# Patient Record
Sex: FEMALE | Race: WHITE | URBAN - METROPOLITAN AREA
[De-identification: names, ages, dates, MRNs, and addresses within clinical notes are randomized per-mention and may not be internally consistent; named-entity substitution may affect disease eponyms.]

---

## 2022-11-14 ENCOUNTER — SURGERY (OUTPATIENT)
Dept: URBAN - METROPOLITAN AREA EXTERNAL CLINIC 26 | Facility: EXTERNAL CLINIC | Age: 62
End: 2022-11-14
Payer: COMMERCIAL

## 2022-11-14 PROCEDURE — 67113 REPAIR RETINAL DETACH CPLX: CPT | Performed by: OPHTHALMOLOGY

## 2022-11-15 ENCOUNTER — POST-OPERATIVE VISIT (OUTPATIENT)
Dept: URBAN - METROPOLITAN AREA CLINIC 27 | Facility: CLINIC | Age: 62
End: 2022-11-15
Payer: COMMERCIAL

## 2022-11-15 DIAGNOSIS — H33.011 RETINAL DETACHMENT WITH SINGLE BREAK, RIGHT EYE: Primary | ICD-10-CM

## 2022-11-15 PROCEDURE — 99024 POSTOP FOLLOW-UP VISIT: CPT | Performed by: OPHTHALMOLOGY

## 2022-11-15 ASSESSMENT — INTRAOCULAR PRESSURE
OS: 17
OD: 14

## 2022-11-15 NOTE — IMPRESSION/PLAN
Impression: S/P 25G PPV/EL/GAS OD - 1 Day. Retinal detachment with single break, right eye  H33.011.  Plan: --retina attached under gas
--no s/s infection
--IOP acceptable
--start PF/Oflox QID
--RD/endoph WS discussed
--alt restrictions/positioning discussed
--f/u 1 week PO OD w/ DYK

## 2022-11-21 ENCOUNTER — POST-OPERATIVE VISIT (OUTPATIENT)
Dept: URBAN - METROPOLITAN AREA CLINIC 27 | Facility: CLINIC | Age: 62
End: 2022-11-21
Payer: COMMERCIAL

## 2022-11-21 DIAGNOSIS — H33.011 RETINAL DETACHMENT WITH SINGLE BREAK, RIGHT EYE: Primary | ICD-10-CM

## 2022-11-21 PROCEDURE — 99024 POSTOP FOLLOW-UP VISIT: CPT | Performed by: OPHTHALMOLOGY

## 2022-11-21 ASSESSMENT — INTRAOCULAR PRESSURE
OD: 19
OS: 14

## 2022-11-21 NOTE — IMPRESSION/PLAN
Impression: S/P 25G PPV/EL/GAS OD - 7 Days. Retinal detachment with single break, right eye  H33.011. Plan: Doing well. Taper Rx. Alt precautions. 

4 weeks DYK

## 2022-12-19 ENCOUNTER — POST-OPERATIVE VISIT (OUTPATIENT)
Dept: URBAN - METROPOLITAN AREA CLINIC 27 | Facility: CLINIC | Age: 62
End: 2022-12-19
Payer: COMMERCIAL

## 2022-12-19 DIAGNOSIS — H33.011 RETINAL DETACHMENT WITH SINGLE BREAK, RIGHT EYE: Primary | ICD-10-CM

## 2022-12-19 PROCEDURE — 99024 POSTOP FOLLOW-UP VISIT: CPT | Performed by: OPHTHALMOLOGY

## 2022-12-19 ASSESSMENT — INTRAOCULAR PRESSURE
OD: 17
OS: 15

## 2022-12-19 NOTE — IMPRESSION/PLAN
Impression: S/P 25G PPV/EL/GAS OD - 35 Days. Retinal detachment with single break, right eye  H33.011. Plan: Fully attached and stable. 

1 month with DYK or SI

## 2023-01-20 ENCOUNTER — POST-OPERATIVE VISIT (OUTPATIENT)
Dept: URBAN - METROPOLITAN AREA CLINIC 27 | Facility: CLINIC | Age: 63
End: 2023-01-20
Payer: COMMERCIAL

## 2023-01-20 DIAGNOSIS — H33.011 RETINAL DETACHMENT WITH SINGLE BREAK, RIGHT EYE: Primary | ICD-10-CM

## 2023-01-20 PROCEDURE — 99024 POSTOP FOLLOW-UP VISIT: CPT | Performed by: OPHTHALMOLOGY

## 2023-01-20 ASSESSMENT — INTRAOCULAR PRESSURE
OS: 16
OD: 18

## 2023-01-20 NOTE — IMPRESSION/PLAN
Impression: S/P 25G PPV/EL/GAS OD - 11/14/2022 ([facility]) Plan: Retina is attached. No s/s of infection IOP is good at (18) Good laser at 4 and 5 oclock OD Return in 1 month, OCT OU, possible TOYIN OS 2) Lattice degeneration OS Will assess need for laser OS next visit

## 2023-02-24 ENCOUNTER — OFFICE VISIT (OUTPATIENT)
Dept: URBAN - METROPOLITAN AREA CLINIC 27 | Facility: CLINIC | Age: 63
End: 2023-02-24
Payer: COMMERCIAL

## 2023-02-24 DIAGNOSIS — H43.813 VITREOUS DEGENERATION, BILATERAL: ICD-10-CM

## 2023-02-24 DIAGNOSIS — H33.011 RETINAL DETACHMENT WITH SINGLE BREAK, RIGHT EYE: Primary | ICD-10-CM

## 2023-02-24 DIAGNOSIS — H04.123 DRY EYE SYNDROME OF BILATERAL LACRIMAL GLANDS: ICD-10-CM

## 2023-02-24 DIAGNOSIS — H35.412 LATTICE DEGENERATION OF RETINA, LEFT EYE: ICD-10-CM

## 2023-02-24 DIAGNOSIS — H25.13 AGE-RELATED NUCLEAR CATARACT, BILATERAL: ICD-10-CM

## 2023-02-24 PROCEDURE — 92134 CPTRZ OPH DX IMG PST SGM RTA: CPT | Performed by: OPHTHALMOLOGY

## 2023-02-24 PROCEDURE — 67220 TREATMENT OF CHOROID LESION: CPT | Performed by: OPHTHALMOLOGY

## 2023-02-24 PROCEDURE — 92014 COMPRE OPH EXAM EST PT 1/>: CPT | Performed by: OPHTHALMOLOGY

## 2023-02-24 RX ORDER — PREDNISOLONE ACETATE 10 MG/ML
1 % SUSPENSION/ DROPS OPHTHALMIC
Qty: 5 | Refills: 3 | Status: ACTIVE
Start: 2023-02-24

## 2023-02-24 RX ORDER — MOXIFLOXACIN HYDROCHLORIDE 5 MG/ML
0.5 % SOLUTION/ DROPS OPHTHALMIC
Qty: 5 | Refills: 3 | Status: ACTIVE
Start: 2023-02-24

## 2023-02-24 ASSESSMENT — INTRAOCULAR PRESSURE
OD: 14
OS: 10

## 2023-02-24 NOTE — IMPRESSION/PLAN
Impression: Recurrent RD OD 
S/P 25G PPV/EL/GAS OD - 11/14/2022 (0 [facility]) Plan: Unfortunately, there is a recurrent RD OD with PVR. RBA discussed. The patient elects surgery Plan: 25 g PPV / SBP / MP / PRP / C3F8 gas vs SO OD

## 2023-02-27 ENCOUNTER — SURGERY (OUTPATIENT)
Dept: URBAN - METROPOLITAN AREA EXTERNAL CLINIC 26 | Facility: EXTERNAL CLINIC | Age: 63
End: 2023-02-27
Payer: COMMERCIAL

## 2023-02-27 PROCEDURE — 67113 REPAIR RETINAL DETACH CPLX: CPT | Performed by: OPHTHALMOLOGY

## 2023-02-28 ENCOUNTER — POST-OPERATIVE VISIT (OUTPATIENT)
Dept: URBAN - METROPOLITAN AREA CLINIC 41 | Facility: CLINIC | Age: 63
End: 2023-02-28
Payer: COMMERCIAL

## 2023-02-28 DIAGNOSIS — H33.011 RETINAL DETACHMENT WITH SINGLE BREAK, RIGHT EYE: Primary | ICD-10-CM

## 2023-02-28 PROCEDURE — 99024 POSTOP FOLLOW-UP VISIT: CPT | Performed by: STUDENT IN AN ORGANIZED HEALTH CARE EDUCATION/TRAINING PROGRAM

## 2023-02-28 ASSESSMENT — INTRAOCULAR PRESSURE
OD: 21
OS: 21

## 2023-02-28 NOTE — IMPRESSION/PLAN
Impression: S/P 25 g PPV / SBP / MP / PRP / C3F8 gas vs SO OD - 1 Day. Retinal detachment with single break, right eye  H33.011. Plan: The eye is recovering as expected postoperatively. Normal postoperative pain and management with over-the-counter nonsteroidal anti-inflammatory drugs was reviewed. We discussed that some redness, swelling of the eyelids and conjunctiva, and mild discharge is normal.

Positioning: sleep on left side, 4x a day for 1 hour face down Limitation of activities was reviewed, including no lifting over 20 pounds, straining, jogging or other strenuous exercise for two weeks. Medication directions and hygiene instructions were also discussed. The patient was instructed to begin the topical drop regimen and to call the office for any increase in pain, redness, or discharge from the eye. 

RTC: 1 week POS OD (0 [facility])

## 2023-03-09 ENCOUNTER — POST-OPERATIVE VISIT (OUTPATIENT)
Dept: URBAN - METROPOLITAN AREA CLINIC 41 | Facility: CLINIC | Age: 63
End: 2023-03-09
Payer: COMMERCIAL

## 2023-03-09 DIAGNOSIS — H33.011 RETINAL DETACHMENT WITH SINGLE BREAK, RIGHT EYE: Primary | ICD-10-CM

## 2023-03-09 PROCEDURE — 99024 POSTOP FOLLOW-UP VISIT: CPT | Performed by: OPHTHALMOLOGY

## 2023-03-09 ASSESSMENT — INTRAOCULAR PRESSURE
OS: 17
OD: 23

## 2023-03-09 NOTE — IMPRESSION/PLAN
Impression: h/o RD OD
S/P 25 g PPV / SBP / MP / PRP / C3F8 gas OD 02/27/2023 ([facility]) Plan: Retina is attached. No s/s of infection IOP is good at (23) Carotenoids Drops Vigamox QID until done Pred QID Return in 8 weeks, OCT OU

## 2023-05-04 ENCOUNTER — OFFICE VISIT (OUTPATIENT)
Dept: URBAN - METROPOLITAN AREA CLINIC 41 | Facility: CLINIC | Age: 63
End: 2023-05-04
Payer: COMMERCIAL

## 2023-05-04 DIAGNOSIS — H43.813 VITREOUS DEGENERATION, BILATERAL: ICD-10-CM

## 2023-05-04 DIAGNOSIS — H33.011 RETINAL DETACHMENT WITH SINGLE BREAK, RIGHT EYE: Primary | ICD-10-CM

## 2023-05-04 DIAGNOSIS — H35.412 LATTICE DEGENERATION OF RETINA, LEFT EYE: ICD-10-CM

## 2023-05-04 DIAGNOSIS — H04.123 DRY EYE SYNDROME OF BILATERAL LACRIMAL GLANDS: ICD-10-CM

## 2023-05-04 DIAGNOSIS — H25.13 AGE-RELATED NUCLEAR CATARACT, BILATERAL: ICD-10-CM

## 2023-05-04 PROCEDURE — 92134 CPTRZ OPH DX IMG PST SGM RTA: CPT | Performed by: OPHTHALMOLOGY

## 2023-05-04 PROCEDURE — 99024 POSTOP FOLLOW-UP VISIT: CPT | Performed by: OPHTHALMOLOGY

## 2023-05-04 ASSESSMENT — INTRAOCULAR PRESSURE
OD: 16
OS: 17

## 2023-05-04 NOTE — IMPRESSION/PLAN
Impression: h/o RD OD
S/P 25 g PPV / SBP / MP / PRP / C3F8 gas OD 02/27/2023 ([facility]) Plan: Retina is attached. Exam and OCT reveal no ERM. Follow. RDW. Carotenoids Return in 6 months for follow up, OCT OU

## 2023-05-04 NOTE — IMPRESSION/PLAN
Impression: Lattice degeneration OS 
h/o retinal hole OS
s/p laser OS Plan: There is a small retinal hole in lattice surrounded by good laser at 2 oclock. Observe.  RDW